# Patient Record
Sex: MALE | Race: WHITE | NOT HISPANIC OR LATINO | URBAN - METROPOLITAN AREA
[De-identification: names, ages, dates, MRNs, and addresses within clinical notes are randomized per-mention and may not be internally consistent; named-entity substitution may affect disease eponyms.]

---

## 2024-05-29 ENCOUNTER — PATIENT MESSAGE (OUTPATIENT)
Dept: GASTROENTEROLOGY | Facility: CLINIC | Age: 54
End: 2024-05-29
Payer: COMMERCIAL

## 2024-05-30 ENCOUNTER — TELEPHONE (OUTPATIENT)
Dept: GASTROENTEROLOGY | Facility: CLINIC | Age: 54
End: 2024-05-30
Payer: COMMERCIAL

## 2024-05-30 NOTE — TELEPHONE ENCOUNTER
----- Message from Julianne Bianchi sent at 5/30/2024  3:38 PM CDT -----  Regarding: pt advice  Contact: [phone]  Who Called:Kings    Who Left Message for Patient:Keri    Does the patient know what this is regarding?:    Would the patient rather a call back or a response via [facility]? Call back    Best Call Back Number: [phone]    Additional Information: pt is not sure difference of a VV or a inperson visit would like more details

## 2024-06-03 ENCOUNTER — OFFICE VISIT (OUTPATIENT)
Dept: GASTROENTEROLOGY | Facility: CLINIC | Age: 54
End: 2024-06-03
Payer: COMMERCIAL

## 2024-06-03 VITALS — BODY MASS INDEX: 22.73 KG/M2 | WEIGHT: 150 LBS | HEIGHT: 68 IN

## 2024-06-03 DIAGNOSIS — K21.9 GASTROESOPHAGEAL REFLUX DISEASE, UNSPECIFIED WHETHER ESOPHAGITIS PRESENT: Primary | ICD-10-CM

## 2024-06-03 DIAGNOSIS — R10.13 DYSPEPSIA: ICD-10-CM

## 2024-06-03 PROCEDURE — 99204 OFFICE O/P NEW MOD 45 MIN: CPT | Mod: 95,,, | Performed by: INTERNAL MEDICINE

## 2024-06-03 RX ORDER — RABEPRAZOLE SODIUM 20 MG/1
20 TABLET, DELAYED RELEASE ORAL DAILY
Qty: 90 TABLET | Refills: 3 | Status: SHIPPED | OUTPATIENT
Start: 2024-06-03 | End: 2025-06-03

## 2024-06-03 NOTE — Clinical Note
GI MA team Prescription for AcipHex 20 mg once daily sent to patient[facility] Referral placed to endoscopy schedulers for EGD with a 96 hour esophageal Bravo pH probe study done on AcipHex 20 mg once daily Please schedule patient telemedicine video visit follow-up with me 3 months. Thank you

## 2024-06-03 NOTE — Clinical Note
Procedure: EGD with 96 hour esophageal Bravo pH probe study done on PPI AcipHex 20 mg once daily  Diagnosis: GERD  Procedure Timin-10 weeks (from today Stephanie 3, 2024)  Location: [address]  Additional Scheduling Information:  Patient should be on his PPI once daily for the study very important that he takes it the morning of the EGD Bravo pH probe with a sip of water even know he has not eating that morning or will have to reschedule him.  Prep Specifications:Standard prep  Is the patient taking a GLP-1 Agonist:no  Have you attached a patient to this message: yes

## 2024-06-03 NOTE — PATIENT INSTRUCTIONS
Procedure: EGD with 96 hour esophageal Bravo pH probe study done on PPI AcipHex 20 mg once daily    Diagnosis: GERD    Procedure Timin-10 weeks (from today Stephanie 3, 2024)    Location: [address]    Additional Scheduling Information:  Patient should be on his PPI once daily for the study very important that he takes it the morning of the EGD Bravo pH probe with a sip of water even know he has not eating that morning or will have to reschedule him.    Prep Specifications:Standard prep    Is the patient taking a GLP-1 Agonist:no    Have you attached a patient to this message: yes

## 2024-06-03 NOTE — Clinical Note
Jarrett can you help Kings sign up for [facility] for AcipHex 20 mg once daily 90 day prescription 3 refills sent Thank you

## 2024-06-03 NOTE — PROGRESS NOTES
The patient location is:  In [place]  The chief complaint leading to consultation is:  Worsening GERD symptoms not responding well to his omeprazole 40 mg once daily    Visit type: audiovisual    Face to Face time with patient: 35 minutes of total time spent on the encounter, which includes face to face time and non-face to face time preparing to see the patient (eg, review of tests), Obtaining and/or reviewing separately obtained history, Documenting clinical information in the electronic or other health record, Independently interpreting results (not separately reported) and communicating results to the patient/family/caregiver, or Care coordination (not separately reported).       Each patient to whom he or she provides medical services by telemedicine is:  (1) informed of the relationship between the physician and patient and the respective role of any other health care provider with respect to management of the patient; and (2) notified that he or she may decline to receive medical services by telemedicine and may withdraw from such care at any time.    Notes:           [facility] Consultation Note    Reason for Consult:  The primary encounter diagnosis was Gastroesophageal reflux disease, unspecified whether esophagitis present. A diagnosis of Dyspepsia was also pertinent to this visit.    PCP:   Saloni Cotto   [address] / [place] [address]    Referring MD:  Saloni Cotto Md  [address] [address]    Initial History of Present Illness (HPI):  This is a 53 y.o. male here for evaluation of longstanding GERD symptoms that got bad probably over the last 2 years significantly worse over the last 8-12 months for last 6 months he has been on omeprazole 40 mg once daily sometimes he takes it before breakfast sometimes he takes it after he has not noticed a difference when he does it either way no dysphagia no odynophagia no unintentional weight loss  he is maybe gained 10 lb in the last 2 years no diarrhea no constipation no change in bowel habits no blood in his stool averages 1 well-formed bowel movement a day he had a colonoscopy at age 50 for screening at [facility] with SHANIQUE he said it was a complete colonoscopy good bowel prep no polyps he was told to have another 1 in 10 years which would be age 60.  He has no family history of colon cancer nobody with advanced colon adenomas polyps no FAP no attenuated FAP no MA P no Olguin syndrome nobody with celiac sprue or inflammatory bowel disease.  He is a nonsmoker drink socially in moderation.  His mom does have history of reflux his mom had bladder cancer at age 76 mom's sister with ovarian cancer at 79 no other ovarian cancers no other bladder cancers nobody with ureter cancer nobody with uterine cancer nobody with esophageal or stomach cancer nobody with Barretts esophagus nobody with small-bowel cancer nobody with liver or gallbladder cancer nobody with pancreatitis or pancreatic cancer.  No fever no chills no shortness of breath no chest pain no flushing or wheezing no palpitation his symptoms are not exertional they tend to get worse in the afternoon around 5:00 p.m. not related to eating at all not related to exercise.  He has not allergic to costume jewelry or to nickel as far as he is aware.  We discuss doing an EGD which she has never had before with a 96 hour esophageal Bravo pH probe study will switch his PPI from 40 mg of omeprazole once daily to AcipHex 20 mg once daily will see if we can set him up for [facility] to save him money will ask Jarrett to help will recommend he take his AcipHex 20 mg once daily best taken 45-60 minutes before his 1st protein meal of the day breakfast we would like him on it for about 6-8 weeks prior to the EGD with a 96 hour esophageal Bravo pH probe study which will be done on his AcipHex 20 mg once daily knows it is very important to take his AcipHex  the morning of his EGD Bravo study with a sip of water even know he will not be eating that day or will have to reschedule him.  He does get a little bit of gas and bloating sometimes.  No dysphagia no odynophagia no early satiety not on a GLP 1 agonist.  No new medications.    Abdominal pain - no  Reflux - as above  Dysphagia - no   Bowel habits - normal  GI bleeding - none  NSAID usage - none    Interval HPI 06/03/2024:  The patient's last visit with me was on Visit date not found.      ROS:  Constitutional: No fevers, chills, No weight loss  ENT:  As above heartburn no dysphagia no odynophagia no hoarseness  CV: No chest pain, no palpitation  Pulm: No cough, No shortness of breath, no wheezing  Ophtho: No vision changes  GI: see HPI  Derm: No rash, no itching  Heme: No lymphadenopathy, No easy bruising  MSK: No significant arthritis  : No dysuria, No hematuria  Endo: No hot or cold intolerance  Neuro: No syncope, No seizure, no strokes  Psych: No uncontrolled anxiety, No uncontrolled depression    Medical History:  has no past medical history on file.    Surgical History:  has no past surgical history on file.    Family History: family history is not on file..     Social History:      Review of patient's allergies indicates:  Not on File    Medication List with Changes/Refills   New Medications    RABEPRAZOLE (ACIPHEX) 20 MG TABLET    Take 1 tablet (20 mg total) by mouth once daily.         Objective Findings:    Vital Signs:  There were no vitals taken for this visit.  There is no height or weight on file to calculate BMI.    Physical Exam:  Telemedicine video visit  General Appearance: Well appearing in no acute distress  Neurologic:  Alert and oriented x4  Psychiatric:  Normal speech mentation and affect    Labs:  Lab Results   Component Value Date    HGBA1C 5.3 04/04/2024       Medical Decision Making:  Lab work reviewed  Patient says he gets his care at [facility] his last lab work was on 05/09/2024 he  said everything was normal  Ppi talk given  Switching ppi talk given  [facility] talk given  Esophageal Bravo pH probe talk given [facility] talk given  EGD talk given    Assessment:  1. Gastroesophageal reflux disease, unspecified whether esophagitis present    2. Dyspepsia         Recommendations:  1. Recommending switching PPIs to AcipHex 20 mg once daily best taken 45-60 minutes before 1st protein meal of the day breakfast  2. Referral to endoscopy schedulers for EGD with a 96 hour esophageal Bravo pH probe study done on PPI AcipHex 20 mg once daily patient knows that he needs to take his PPI is AcipHex the morning of the study with a sip of water even know he will not be eating before the EGD Bravo pH probe that morning or will have to reschedule him  3. Return GI clinic 3 months for follow-up okay for telemedicine video visit     Follow up in about 3 months (around 9/3/2024).      Order summary:  Orders Placed This Encounter    RABEprazole (ACIPHEX) 20 mg tablet         Thank you so much for allowing me to participate in the care of Kings Luciano MD    DISCLAIMER: This note was prepared with [employer] voice recognition transcription software. Garbled syntax, mangled or inadvertent pronouns, and other bizarre constructions may be attributed to that software system. While efforts were made to correct any mistakes made by this voice recognition program, some errors and/or omissions may remain in the note that were missed when the note was originally created.

## 2024-06-04 ENCOUNTER — PATIENT MESSAGE (OUTPATIENT)
Dept: GASTROENTEROLOGY | Facility: CLINIC | Age: 54
End: 2024-06-04
Payer: COMMERCIAL

## 2024-06-14 ENCOUNTER — PATIENT MESSAGE (OUTPATIENT)
Dept: GASTROENTEROLOGY | Facility: CLINIC | Age: 54
End: 2024-06-14
Payer: COMMERCIAL

## 2024-06-20 DIAGNOSIS — K21.9 GASTROESOPHAGEAL REFLUX DISEASE, UNSPECIFIED WHETHER ESOPHAGITIS PRESENT: ICD-10-CM

## 2024-06-20 DIAGNOSIS — R10.13 DYSPEPSIA: ICD-10-CM

## 2024-06-20 RX ORDER — RABEPRAZOLE SODIUM 20 MG/1
20 TABLET, DELAYED RELEASE ORAL DAILY
Qty: 30 TABLET | Refills: 11 | Status: SHIPPED | OUTPATIENT
Start: 2024-06-20 | End: 2025-06-20

## 2024-06-20 RX ORDER — RABEPRAZOLE SODIUM 20 MG/1
20 TABLET, DELAYED RELEASE ORAL DAILY
Qty: 90 TABLET | Refills: 3 | Status: SHIPPED | OUTPATIENT
Start: 2024-06-20 | End: 2025-06-20

## 2024-06-20 RX ORDER — RABEPRAZOLE SODIUM 20 MG/1
20 TABLET, DELAYED RELEASE ORAL
Refills: 3 | Status: CANCELLED | OUTPATIENT
Start: 2024-06-20

## 2024-06-21 ENCOUNTER — TELEPHONE (OUTPATIENT)
Dept: ENDOSCOPY | Facility: HOSPITAL | Age: 54
End: 2024-06-21
Payer: COMMERCIAL

## 2024-06-21 NOTE — TELEPHONE ENCOUNTER
Contacted the patient to schedule an endoscopy procedure(s) 6/21/24. The patient did not answer the call and left a voice message requesting a call back.

## 2024-06-21 NOTE — TELEPHONE ENCOUNTER
----- Message from Naomi Wang sent at 6/3/2024  9:27 AM CDT -----    ----- Message -----  From: Lobo Luciano MD  Sent: 6/3/2024   8:38 AM CDT  To: [facility]    Procedure: EGD with 96 hour esophageal Bravo pH probe study done on PPI AcipHex 20 mg once daily    Diagnosis: GERD    Procedure Timin-10 weeks (from today Stephanie 3, 2024)    Location: [address]    Additional Scheduling Information:  Patient should be on his PPI once daily for the study very important that he takes it the morning of the EGD Bravo pH probe with a sip of water even know he has not eating that morning or will have to reschedule him.    Prep Specifications:Standard prep    Is the patient taking a GLP-1 Agonist:no    Have you attached a patient to this message: yes

## 2024-06-24 ENCOUNTER — TELEPHONE (OUTPATIENT)
Dept: GASTROENTEROLOGY | Facility: CLINIC | Age: 54
End: 2024-06-24
Payer: COMMERCIAL

## 2024-06-24 NOTE — TELEPHONE ENCOUNTER
----- Message from Nadia Trevino sent at 6/24/2024 11:48 AM CDT -----  Regarding: Medication Clarity Needed  Contact: [phone] (option 2)  [facility] is calling stating they received two prescriptions for medication: RABEprazole (ACIPHEX) 20 mg tablet. She states pharmacy is just trying to confirm which medication script is correct. Please give pharmacy a call.       [facility]  [place] [address]  Phone: [phone] Fax: [phone]    Reference Number: [id number]

## 2024-07-02 ENCOUNTER — TELEPHONE (OUTPATIENT)
Dept: ENDOSCOPY | Facility: HOSPITAL | Age: 54
End: 2024-07-02
Payer: COMMERCIAL

## 2024-07-02 NOTE — TELEPHONE ENCOUNTER
----- Message from Naomi Wang sent at 6/3/2024  9:27 AM CDT -----    ----- Message -----  From: Lobo Luciano MD  Sent: 6/3/2024   8:38 AM CDT  To: [facility] Patients    Procedure: EGD with 96 hour esophageal Bravo pH probe study done on PPI AcipHex 20 mg once daily    Diagnosis: GERD    Procedure Timin-10 weeks (from today Stephanie 3, 2024)    Location: [address]    Additional Scheduling Information:  Patient should be on his PPI once daily for the study very important that he takes it the morning of the EGD Bravo pH probe with a sip of water even know he has not eating that morning or will have to reschedule him.    Prep Specifications:Standard prep    Is the patient taking a GLP-1 Agonist:no    Have you attached a patient to this message: yes

## 2024-07-07 ENCOUNTER — PATIENT MESSAGE (OUTPATIENT)
Dept: GASTROENTEROLOGY | Facility: CLINIC | Age: 54
End: 2024-07-07
Payer: COMMERCIAL

## 2024-07-11 ENCOUNTER — TELEPHONE (OUTPATIENT)
Dept: ENDOSCOPY | Facility: HOSPITAL | Age: 54
End: 2024-07-11
Payer: COMMERCIAL

## 2024-07-11 VITALS — BODY MASS INDEX: 22.73 KG/M2 | HEIGHT: 68 IN | WEIGHT: 150 LBS

## 2024-07-11 DIAGNOSIS — K21.9 GASTROESOPHAGEAL REFLUX DISEASE, UNSPECIFIED WHETHER ESOPHAGITIS PRESENT: Primary | ICD-10-CM

## 2024-07-11 NOTE — TELEPHONE ENCOUNTER
----- Message from Naomi Wang sent at 6/3/2024  9:27 AM CDT -----    ----- Message -----  From: Lobo Luciano MD  Sent: 6/3/2024   8:38 AM CDT  To: [facility] Patients    Procedure: EGD with 96 hour esophageal Bravo pH probe study done on PPI AcipHex 20 mg once daily    Diagnosis: GERD    Procedure Timin-10 weeks (from today Stephanie 3, 2024)    Location: [address]    Additional Scheduling Information:  Patient should be on his PPI once daily for the study very important that he takes it the morning of the EGD Bravo pH probe with a sip of water even know he has not eating that morning or will have to reschedule him.    Prep Specifications:Standard prep    Is the patient taking a GLP-1 Agonist:no    Have you attached a patient to this message: yes

## 2024-07-11 NOTE — TELEPHONE ENCOUNTER
Spoke to Kings to schedule procedure(s) Upper Endoscopy (EGD) with Bravo Placement       Physician to perform procedure(s) Dr. NELL Luciano  Date of Procedure (s) 9/9/24  Arrival Time 12:45 PM  Time of Procedure(s) 1:45 PM   Location of Procedure(s) [place] 4th Floor  Type of Rx Prep sent to patient: Other  Instructions provided to patient via [facility]    Patient was informed on the following information and verbalized understanding. Screening questionnaire reviewed with patient and complete. If procedure requires anesthesia, a responsible adult needs to be present to accompany the patient home, patient cannot drive after receiving anesthesia. Appointment details are tentative, especially check-in time. Patient will receive a prep-op call 7 days prior to confirm check-in time for procedure. If applicable the patient should contact their pharmacy to verify Rx for procedure prep is ready for pick-up. Patient was advised to call the scheduling department at [phone] if pharmacy states no Rx is available. Patient was advised to call the endoscopy scheduling department if any questions or concerns arise.       Endoscopy Scheduling Department

## 2024-08-30 ENCOUNTER — TELEPHONE (OUTPATIENT)
Dept: ENDOSCOPY | Facility: HOSPITAL | Age: 54
End: 2024-08-30
Payer: COMMERCIAL

## 2024-08-30 ENCOUNTER — PATIENT MESSAGE (OUTPATIENT)
Dept: ENDOSCOPY | Facility: HOSPITAL | Age: 54
End: 2024-08-30
Payer: COMMERCIAL

## 2024-08-30 RX ORDER — CETIRIZINE HYDROCHLORIDE 10 MG/1
1 TABLET ORAL DAILY
COMMUNITY

## 2024-08-30 RX ORDER — ESCITALOPRAM OXALATE 5 MG/1
5 TABLET ORAL
COMMUNITY
Start: 2024-03-19

## 2024-08-30 RX ORDER — ROSUVASTATIN CALCIUM 10 MG/1
1 TABLET, COATED ORAL DAILY
COMMUNITY
Start: 2024-04-04 | End: 2027-03-20

## 2024-08-30 NOTE — TELEPHONE ENCOUNTER
Spoke to pt for pre-call to confirm scheduled Upper Endoscopy (EGD) with Bravo Placement and patient verbalized understanding of the following:       Date of Procedure (s)  verified 9/9/24  Arrival Time 10:00 AM verified.  Location of Procedure(s) [place] 4th Floor verified.  NPO status reinforced. Ok to continue clear liquids up until 4 hours prior to the Endoscopy procedure.     patient denies taking blood thinning or glp1 medications  .   Pt confirmed receipt of prep instructions and Rx prep (if applicable).  Instructions provided to patient via [facility]  Pt confirmed ride home after procedure if procedure requires anesthesia.   Pre-call screening questionnaire reviewed and completed with patient.   Appointment details are tentative, including check-in time.  If the patient begins taking any blood thinning medications, injectable weight loss/diabetes medications (other than insulin), or Adipex (phentermine) patient was instructed to contact the endoscopy scheduling department as soon as possible.  Patient was advised to call the endoscopy scheduling department if any questions or concerns arise.       [facility] Endoscopy Scheduling Department

## 2024-08-30 NOTE — TELEPHONE ENCOUNTER
- Message from Naomi Wang sent at 6/3/2024  9:27 AM CDT -----     ----- Message -----  From: Lobo Luciano MD  Sent: 6/3/2024   8:38 AM CDT  To: [facility] Patients     Procedure: EGD with 96 hour esophageal Bravo pH probe study done on PPI AcipHex 20 mg once daily     Diagnosis: GERD     Procedure Timin-10 weeks (from today Stephanie 3, 2024)     Location: [address]     Additional Scheduling Information:  Patient should be on his PPI once daily for the study very important that he takes it the morning of the EGD Bravo pH probe with a sip of water even know he has not eating that morning or will have to reschedule him.     Prep Specifications:Standard prep     Is the patient taking a GLP-1 Agonist:no     Have you attached a patient to this message: yes

## 2024-09-09 ENCOUNTER — HOSPITAL ENCOUNTER (OUTPATIENT)
Facility: HOSPITAL | Age: 54
Discharge: HOME | End: 2024-09-09
Attending: INTERNAL MEDICINE | Admitting: INTERNAL MEDICINE
Payer: COMMERCIAL

## 2024-09-09 ENCOUNTER — ANESTHESIA EVENT (OUTPATIENT)
Dept: ENDOSCOPY | Facility: HOSPITAL | Age: 54
End: 2024-09-09
Payer: COMMERCIAL

## 2024-09-09 ENCOUNTER — ANESTHESIA (OUTPATIENT)
Dept: ENDOSCOPY | Facility: HOSPITAL | Age: 54
End: 2024-09-09
Payer: COMMERCIAL

## 2024-09-09 VITALS
SYSTOLIC BLOOD PRESSURE: 127 MMHG | RESPIRATION RATE: 15 BRPM | DIASTOLIC BLOOD PRESSURE: 74 MMHG | OXYGEN SATURATION: 98 % | TEMPERATURE: 98 F | HEIGHT: 68 IN | HEART RATE: 78 BPM | WEIGHT: 150 LBS | BODY MASS INDEX: 22.73 KG/M2

## 2024-09-09 DIAGNOSIS — R10.13 DYSPEPSIA: ICD-10-CM

## 2024-09-09 DIAGNOSIS — K21.9 GERD (GASTROESOPHAGEAL REFLUX DISEASE): ICD-10-CM

## 2024-09-09 DIAGNOSIS — K21.9 GASTROESOPHAGEAL REFLUX DISEASE, UNSPECIFIED WHETHER ESOPHAGITIS PRESENT: ICD-10-CM

## 2024-09-09 DIAGNOSIS — K21.9 GASTROESOPHAGEAL REFLUX DISEASE, UNSPECIFIED WHETHER ESOPHAGITIS PRESENT: Primary | ICD-10-CM

## 2024-09-09 PROCEDURE — 37000009 HC ANESTHESIA EA ADD 15 MINS: Performed by: INTERNAL MEDICINE

## 2024-09-09 PROCEDURE — 82657 ENZYME CELL ACTIVITY: CPT | Performed by: PATHOLOGY

## 2024-09-09 PROCEDURE — 37000008 HC ANESTHESIA 1ST 15 MINUTES: Performed by: INTERNAL MEDICINE

## 2024-09-09 PROCEDURE — 43239 EGD BIOPSY SINGLE/MULTIPLE: CPT | Performed by: INTERNAL MEDICINE

## 2024-09-09 PROCEDURE — 25000003 PHARM REV CODE 250: Performed by: NURSE ANESTHETIST, CERTIFIED REGISTERED

## 2024-09-09 PROCEDURE — 91035 G-ESOPH REFLX TST W/ELECTROD: CPT | Mod: TC | Performed by: INTERNAL MEDICINE

## 2024-09-09 PROCEDURE — 43239 EGD BIOPSY SINGLE/MULTIPLE: CPT | Mod: ,,, | Performed by: INTERNAL MEDICINE

## 2024-09-09 PROCEDURE — 27201012 HC FORCEPS, HOT/COLD, DISP: Performed by: INTERNAL MEDICINE

## 2024-09-09 PROCEDURE — 27200942: Performed by: INTERNAL MEDICINE

## 2024-09-09 RX ORDER — SODIUM CHLORIDE 9 MG/ML
INJECTION, SOLUTION INTRAVENOUS CONTINUOUS
Status: DISCONTINUED | OUTPATIENT
Start: 2024-09-09 | End: 2024-09-09 | Stop reason: HOSPADM

## 2024-09-09 RX ORDER — RABEPRAZOLE SODIUM 20 MG/1
20 TABLET, DELAYED RELEASE ORAL
Qty: 90 TABLET | Refills: 3 | Status: SHIPPED | OUTPATIENT
Start: 2024-09-09 | End: 2025-09-09

## 2024-09-09 RX ORDER — LIDOCAINE HYDROCHLORIDE 20 MG/ML
INJECTION INTRAVENOUS
Status: DISCONTINUED | OUTPATIENT
Start: 2024-09-09 | End: 2024-09-09

## 2024-09-09 RX ORDER — PROPOFOL 10 MG/ML
VIAL (ML) INTRAVENOUS
Status: DISCONTINUED | OUTPATIENT
Start: 2024-09-09 | End: 2024-09-09

## 2024-09-09 RX ADMIN — Medication 100 MG: at 10:09

## 2024-09-09 RX ADMIN — SODIUM CHLORIDE: 0.9 INJECTION, SOLUTION INTRAVENOUS at 11:09

## 2024-09-09 RX ADMIN — LIDOCAINE HYDROCHLORIDE 100 MG: 20 INJECTION INTRAVENOUS at 10:09

## 2024-09-09 RX ADMIN — SODIUM CHLORIDE: 0.9 INJECTION, SOLUTION INTRAVENOUS at 10:09

## 2024-09-09 NOTE — ANESTHESIA POSTPROCEDURE EVALUATION
Anesthesia Post Evaluation    Patient: Kings Wolff    Procedure(s) Performed: Procedure(s) (LRB):  EGD (ESOPHAGOGASTRODUODENOSCOPY) (N/A)  PH MONITORING, ESOPHAGUS, WIRELESS, (ON REFLUX MEDS) (N/A)    Final Anesthesia Type: general      Patient location during evaluation: GI PACU  Patient participation: Yes- Able to Participate  Level of consciousness: awake and alert  Post-procedure vital signs: reviewed and stable  Pain management: adequate  Airway patency: patent    PONV status at discharge: No PONV  Anesthetic complications: no      Cardiovascular status: blood pressure returned to baseline  Respiratory status: unassisted  Hydration status: euvolemic  Follow-up not needed.          Vitals Value Taken Time   /74 09/09/24 1200   Temp 36.7 °C (98.1 °F) 09/09/24 1134   Pulse 78 09/09/24 1200   Resp 15 09/09/24 1200   SpO2 98 % 09/09/24 1200         Event Time   Out of Recovery 12:09:33         Pain/Kaylynn Score: Kaylynn Score: 10 (9/9/2024 11:34 AM)

## 2024-09-09 NOTE — ANESTHESIA PREPROCEDURE EVALUATION
09/09/2024  Kings Wolff is a 54 y.o., male.    No past medical history on file.  No past surgical history on file.    Pre-op Assessment    I have reviewed the Patient Summary Reports.     I have reviewed the Nursing Notes. I have reviewed the NPO Status.   I have reviewed the Medications.     Review of Systems  Anesthesia Hx:  No problems with previous Anesthesia             Denies Family Hx of Anesthesia complications.    Denies Personal Hx of Anesthesia complications.                    Social:  Alcohol Use       Hematology/Oncology:  Hematology Normal   Oncology Normal                                   EENT/Dental:  EENT/Dental Normal           Cardiovascular:  Cardiovascular Normal                                            Renal/:  Renal/ Normal                 Hepatic/GI:     GERD             Musculoskeletal:  Musculoskeletal Normal                Neurological:  Neurology Normal                                      Endocrine:  Endocrine Normal            Dermatological:  Skin Normal    Psych:  Psychiatric History  denies depression                Physical Exam  General: Well nourished    Airway:  Mallampati: II   Mouth Opening: Normal  TM Distance: Normal  Tongue: Normal  Neck ROM: Normal ROM    Dental:  Intact        Anesthesia Plan  Type of Anesthesia, risks & benefits discussed:    Anesthesia Type: Gen Natural Airway  Intra-op Monitoring Plan: Standard ASA Monitors  Informed Consent: Informed consent signed with the Patient and all parties understand the risks and agree with anesthesia plan.  All questions answered.   ASA Score: 2  Day of Surgery Review of History & Physical: H&P Update referred to the surgeon/provider.I have interviewed and examined the patient. I have reviewed the patient's H&P dated: There are no significant changes.     Ready For Surgery From Anesthesia Perspective.      .

## 2024-09-09 NOTE — H&P
[address] [facility] 4th Floor  History & Physical    Subjective:      Chief Complaint/Reason for Admission:     EGD with esophageal Bravo pH for GERD symptoms on Aciphex 20mg     Kings Wolff is a 54 y.o. male.    History reviewed. No pertinent past medical history.  History reviewed. No pertinent surgical history.  Social History     Tobacco Use    Smoking status: Never    Smokeless tobacco: Never   Substance Use Topics    Alcohol use: Yes     Comment: socially    Drug use: Never       PTA Medications   Medication Sig    cetirizine (ZYRTEC) 10 MG tablet Take 1 tablet by mouth once daily.    emtricita-rilpivirine-tenof DF (COMPLERA) 200- mg Tab     EScitalopram oxalate (LEXAPRO) 5 MG Tab Take 5 mg by mouth.    RABEprazole (ACIPHEX) 20 mg tablet Take 1 tablet (20 mg total) by mouth once daily.    RABEprazole (ACIPHEX) 20 mg tablet Take 1 tablet (20 mg total) by mouth once daily.    rosuvastatin (CRESTOR) 10 MG tablet Take 1 tablet by mouth once daily.     Review of patient's allergies indicates:   Allergen Reactions    Aller xt-tree pollen-white oak     House dust mite         Review of Systems   Constitutional:  Negative for fever.   Cardiovascular:  Negative for chest pain.       Objective:      Vital Signs (Most Recent)  Temp: 98.2 °F (36.8 °C) (09/09/24 1019)  Pulse: 73 (09/09/24 1019)  Resp: 14 (09/09/24 1019)  BP: 126/75 (09/09/24 1019)  SpO2: 95 % (09/09/24 1019)    Vital Signs Range (Last 24H):  Temp:  [98.2 °F (36.8 °C)]   Pulse:  [73]   Resp:  [14]   BP: (126)/(75)   SpO2:  [95 %]     Physical Exam  Cardiovascular:      Rate and Rhythm: Normal rate.   Pulmonary:      Effort: Pulmonary effort is normal.   Neurological:      Mental Status: He is alert and oriented to person, place, and time.             Assessment:        EGD with esophageal Bravo pH for GERD symptoms on Aciphex 20mg       Plan:      EGD with esophageal Bravo pH for GERD symptoms on Aciphex 20mg

## 2024-09-09 NOTE — PROVATION PATIENT INSTRUCTIONS
Discharge Summary/Instructions after an Endoscopic Procedure  Patient Name: Kings Wolff  Patient MRN: [id number]  Patient YOB: 1970  Monday, September 9, 2024  Lobo Luciano MD  Dear patient,  As a result of recent federal legislation (The Federal Cures Act), you may   receive lab or pathology results from your procedure in your [facility]   account before your physician is able to contact you. Your physician or   their representative will relay the results to you with their   recommendations at their soonest availability.  Thank you,  RESTRICTIONS:  During your procedure today, you received medications for sedation.  These   medications may affect your judgment, balance and coordination.  Therefore,   for 24 hours, you have the following restrictions:   - DO NOT drive a car, operate machinery, make legal/financial decisions,   sign important papers or drink alcohol.    ACTIVITY:  Today: no heavy lifting, straining or running due to procedural   sedation/anesthesia.  The following day: return to full activity including work.  DIET:  Eat and drink normally unless instructed otherwise.     TREATMENT FOR COMMON SIDE EFFECTS:  - Mild abdominal pain, nausea, belching, bloating or excessive gas:  rest,   eat lightly and use a heating pad.  - Sore Throat: treat with throat lozenges and/or gargle with warm salt   water.  - Because air was used during the procedure, expelling large amounts of air   from your rectum or belching is normal.  - If a bowel prep was taken, you may not have a bowel movement for 1-3 days.    This is normal.  SYMPTOMS TO WATCH FOR AND REPORT TO YOUR PHYSICIAN:  1. Abdominal pain or bloating, other than gas cramps.  2. Chest pain.  3. Back pain.  4. Signs of infection such as: chills or fever occurring within 24 hours   after the procedure.  5. Rectal bleeding, which would show as bright red, maroon, or black stools.   (A tablespoon of blood from the rectum is not serious, especially  if   hemorrhoids are present.)  6. Vomiting.  7. Weakness or dizziness.  GO DIRECTLY TO THE NEAREST EMERGENCY ROOM IF YOU HAVE ANY OF THE FOLLOWING:      Difficulty breathing              Chills and/or fever over 101 F   Persistent vomiting and/or vomiting blood   Severe abdominal pain   Severe chest pain   Black, tarry stools   Bleeding- more than one tablespoon   Any other symptom or condition that you feel may need urgent attention  Your doctor recommends these additional instructions:  If any biopsies were taken, your doctors clinic will contact you in 1 to 2   weeks with any results.  - Discharge patient to home.   - Await pathology results.   - Telephone endoscopist for pathology results in 3 weeks.   - Repeat upper endoscopy in 3 years for surveillance based on pathology   results.   - Continue present medications.Aciphex 20mg by mouth once daily   - Return to GI clinic.   - The findings and recommendations were discussed with the patient.  For questions, problems or results please call your physician - Lobo Luciano MD at Work:  [phone].  [facility] [place], EMERGENCY ROOM PHONE NUMBER: [phone]  IF A COMPLICATION OR EMERGENCY SITUATION ARISES AND YOU ARE UNABLE TO REACH   YOUR PHYSICIAN - GO DIRECTLY TO THE EMERGENCY ROOM.  Lobo Luciano MD  9/9/2024 11:52:26 AM  This report has been verified and signed electronically.  Dear patient,  As a result of recent federal legislation (The Federal Cures Act), you may   receive lab or pathology results from your procedure in your [facility]   account before your physician is able to contact you. Your physician or   their representative will relay the results to you with their   recommendations at their soonest availability.  Thank you,  PROVATION

## 2024-09-09 NOTE — TRANSFER OF CARE
"Anesthesia Transfer of Care Note    Patient: Kings Wolff    Procedure(s) Performed: Procedure(s) (LRB):  EGD (ESOPHAGOGASTRODUODENOSCOPY) (N/A)  PH MONITORING, ESOPHAGUS, WIRELESS, (ON REFLUX MEDS) (N/A)    Patient location: GI    Anesthesia Type: general    Transport from OR: Transported from OR on room air with adequate spontaneous ventilation    Post pain: adequate analgesia    Post assessment: no apparent anesthetic complications    Post vital signs: stable    Level of consciousness: awake    Nausea/Vomiting: no nausea/vomiting    Complications: none    Transfer of care protocol was followed      Last vitals: Visit Vitals  BP 99/65  (BP Location: Left arm, Patient Position: Lying)   Pulse 70   Temp 36.7 °C (98.1 °F)   Resp 15   Ht 5' 8" (1.727 m)   Wt 68 kg (150 lb)   SpO2 96%   BMI 22.81 kg/m²     "

## 2024-09-12 LAB
FINAL PATHOLOGIC DIAGNOSIS: NORMAL
Lab: NORMAL

## 2024-09-28 ENCOUNTER — PATIENT MESSAGE (OUTPATIENT)
Dept: GASTROENTEROLOGY | Facility: CLINIC | Age: 54
End: 2024-09-28
Payer: COMMERCIAL

## 2024-09-29 ENCOUNTER — PATIENT MESSAGE (OUTPATIENT)
Dept: GASTROENTEROLOGY | Facility: CLINIC | Age: 54
End: 2024-09-29
Payer: COMMERCIAL